# Patient Record
Sex: FEMALE | Race: OTHER | HISPANIC OR LATINO | Employment: UNEMPLOYED | URBAN - METROPOLITAN AREA
[De-identification: names, ages, dates, MRNs, and addresses within clinical notes are randomized per-mention and may not be internally consistent; named-entity substitution may affect disease eponyms.]

---

## 2023-12-28 ENCOUNTER — HOSPITAL ENCOUNTER (EMERGENCY)
Facility: MEDICAL CENTER | Age: 28
End: 2023-12-28
Attending: EMERGENCY MEDICINE
Payer: MEDICAID

## 2023-12-28 VITALS
RESPIRATION RATE: 16 BRPM | SYSTOLIC BLOOD PRESSURE: 103 MMHG | DIASTOLIC BLOOD PRESSURE: 59 MMHG | HEART RATE: 55 BPM | TEMPERATURE: 97.9 F | OXYGEN SATURATION: 96 % | WEIGHT: 143.3 LBS | HEIGHT: 65 IN | BODY MASS INDEX: 23.88 KG/M2

## 2023-12-28 DIAGNOSIS — R68.89 FLU-LIKE SYMPTOMS: Primary | ICD-10-CM

## 2023-12-28 DIAGNOSIS — J02.9 ACUTE VIRAL PHARYNGITIS: ICD-10-CM

## 2023-12-28 LAB
FLUAV RNA SPEC QL NAA+PROBE: NEGATIVE
FLUBV RNA SPEC QL NAA+PROBE: NEGATIVE
RSV RNA SPEC QL NAA+PROBE: NEGATIVE
S PYO DNA SPEC NAA+PROBE: NOT DETECTED
SARS-COV-2 RNA RESP QL NAA+PROBE: NOTDETECTED

## 2023-12-28 PROCEDURE — 99283 EMERGENCY DEPT VISIT LOW MDM: CPT

## 2023-12-28 PROCEDURE — 0241U HCHG SARS-COV-2 COVID-19 NFCT DS RESP RNA 4 TRGT ED POC: CPT

## 2023-12-28 PROCEDURE — 87651 STREP A DNA AMP PROBE: CPT

## 2023-12-28 PROCEDURE — C9803 HOPD COVID-19 SPEC COLLECT: HCPCS

## 2023-12-28 RX ORDER — ACETAMINOPHEN 500 MG
1000 TABLET ORAL EVERY 6 HOURS PRN
Qty: 20 TABLET | Refills: 0 | Status: SHIPPED | OUTPATIENT
Start: 2023-12-28 | End: 2024-01-01

## 2023-12-28 RX ORDER — IBUPROFEN 800 MG/1
800 TABLET ORAL EVERY 8 HOURS PRN
Qty: 9 TABLET | Refills: 0 | Status: SHIPPED | OUTPATIENT
Start: 2023-12-28 | End: 2023-12-31

## 2023-12-28 ASSESSMENT — PAIN DESCRIPTION - PAIN TYPE: TYPE: ACUTE PAIN

## 2023-12-29 NOTE — ED PROVIDER NOTES
ED Provider Note    Scribed for Alfonzo Marroquin by Jamila Peraza. 12/28/2023  6:55 PM    Primary care provider: No primary care provider noted.  Means of arrival: Private vehicle  History obtained from: Patient  History limited by: None    CHIEF COMPLAINT  Chief Complaint   Patient presents with    Flu Like Symptoms     Began this morning, headache, sore throat, runny nose, states feels like the beginning of the flu, 8/10 general pain, partner has the flu       HPI/ROS  LIMITATION TO HISTORY   Select: Language Puerto Rican,  Used     HPI  Monika Amaya is a 28 y.o. female who presents to the Emergency Department for evaluation of flu like symptoms onset this morning. She describes that her significant other who lives with her currently has a cold. The patient states she also has headache, sore throat and nasal congestion, but denies any cough. She notes that it hurts when she swallows. The patient reports that all her symptoms started this morning. Per nursing note, the patient has general pain that is an 8/10 in severity. The patient had some tea with honey, but denies the use of Tylenol or Motrin today. The patient denies any past medical history. She also denies the use of daily medication. The patient denies the use of drugs, alcohol, or tobacco.     REVIEW OF SYSTEMS  As above, all other systems reviewed and are negative.   See HPI for further details.     PAST MEDICAL HISTORY   The patient denies any past medical history.    SURGICAL HISTORY  patient denies any surgical history    SOCIAL HISTORY  Social History     Tobacco Use    Smoking status: Never    Smokeless tobacco: Never   Vaping Use    Vaping Use: Never used   Substance Use Topics    Alcohol use: Never    Drug use: Never      Social History     Substance and Sexual Activity   Drug Use Never     FAMILY HISTORY  History reviewed. No pertinent family history.    CURRENT MEDICATIONS  Home Medications       Reviewed by Kate  "ED Lisa R.N. (Registered Nurse) on 12/28/23 at 1828  Med List Status: Partial     Medication Last Dose Status        Patient Paulino Taking any Medications                         ALLERGIES  No Known Allergies    PHYSICAL EXAM    VITAL SIGNS:   Vitals:    12/28/23 1815 12/28/23 1818   BP: 103/55    Pulse: 98    Resp: 20    Temp: 36.8 °C (98.2 °F)    TempSrc: Temporal    SpO2: 98%    Weight:  65 kg (143 lb 4.8 oz)   Height:  1.651 m (5' 5\")     Vitals: My interpretation: normotensive, not tachycardic, afebrile, not hypoxic    Reinterpretation of vitals: Unchanged unremarkable    PE:   Gen: sitting comfortably, speaking clearly, appears in no acute distress   ENT: Mucous membranes moist, posterior pharynx clear, no exudates or plaques, uvula midline, nares patent bilaterally.  Neck: Supple, FROM  Pulmonary: Lungs are clear to auscultation bilaterally. No tachypnea  CV:  RRR, no murmur appreciated, pulses 2+ in both upper and lower extremities  Abdomen: soft, NT/ND; no rebound/guarding  : no CVA or suprapubic tenderness   Neuro: A&Ox4 (person, place, time, situation), speech fluent, gait steady, no focal deficits appreciated  Skin: No rash or lesions.  No pallor or jaundice.  No cyanosis.  Warm and dry.     DIAGNOSTIC STUDIES / PROCEDURES    LABS  Results for orders placed or performed during the hospital encounter of 12/28/23   Group A Strep by PCR    Specimen: Throat   Result Value Ref Range    Group A Strep by PCR Not Detected Not Detected   POC CoV-2, FLU A/B, RSV by PCR   Result Value Ref Range    POC Influenza A RNA, PCR Negative Negative    POC Influenza B RNA, PCR Negative Negative    POC RSV, by PCR Negative Negative    POC SARS-CoV-2, PCR NotDetected       All labs reviewed by me. Labs were compared to prior labs if they were available. Significant for negative flu, negative COVID, negative RSV, negative strep    COURSE & MEDICAL DECISION MAKING  Nursing notes, VS, PMSFHx, labs, imaging, EKG reviewed in " chart.    ED Observation Status? No; Patient does not meet criteria for ED Observation.     Ddx: Flu, COVID, RSV, strep, meningitis, pneumonia    MDM: 6:55 PM Monika Amaya is a 28 y.o. female who presented with generalized flulike symptoms since waking up this morning.  Her partner at home was diagnosed with influenza she states has a cold for the past few days.  Her symptoms started this morning with headache, runny nose, congestion, sore throat.  In general flulike symptoms.  She is healthy, takes medications.  Denies fevers.  No neck symptoms or hurts or rash.  No nausea or vomiting or cough.  Does not feel short of breath or have chest pain.  Her vital signs are unremarkable she is afebrile.  Her physical exam is very reassuring and benign, clear lungs, no signs of infection, denies pregnancy status, denies dysuria hematuria.  She underwent flu, COVID, RSV and strep testing which were all negative.  I suspect she likely has early influenza and tested negative because her symptoms simply started this morning versus some other type of URI/viral illness.  Regardless the treatment will be the same with Tylenol Motrin and conservative management as an outpatient.  Instructed on return precautions and the patient verbalized understanding.   was used for all interactions.    ADDITIONAL PROBLEM LIST AND DISPOSITION    I have discussed management of the patient with the following physicians and RE's:  None.    Discussion of management with other [facility] or appropriate source(s): None     Escalation of care considered, and ultimately not performed:diagnostic imaging    Barriers to care at this time, including but not limited to: Patient does not have established PCP.     Decision tools and prescription drugs considered including, but not limited to: Pain Medications Tylenol Motrin .    FINAL IMPRESSION  1. Flu-like symptoms Acute      I, Jamila Peraza (Leighann), am scribing for, and in the  presence of, Alfonzo Marroquin.    Electronically signed by: Jamila Peraza (Scribe), 12/28/2023    I, Alfonzo Marroquin personally performed the services described in this documentation, as scribed by Jamila Peraza in my presence, and it is both accurate and complete.    The note accurately reflects work and decisions made by me.  Alfonzo Marroquin  12/28/2023  8:41 PM

## 2023-12-29 NOTE — ED TRIAGE NOTES
"Chief Complaint   Patient presents with    Flu Like Symptoms     Began this morning, headache, sore throat, runny nose, states feels like the beginning of the flu, 8/10 general pain, partner has the flu        Ambulated to triage for above complaint.     COVID protocols ordered. Pt educated of triage process and informed to contact staff if situation changes.    /55   Pulse 98   Temp 36.8 °C (98.2 °F) (Temporal)   Resp 20   Ht 1.651 m (5' 5\")   Wt 65 kg (143 lb 4.8 oz)   SpO2 98%   BMI 23.85 kg/m²      "

## 2023-12-29 NOTE — DISCHARGE INSTRUCTIONS
I think it is likely that they have a viral syndrome, quite possibly COVID, flu or RSV.    There is no particular treatment at this time for viral syndromes other than alternating doses of children's Tylenol and Motrin, up to 3 doses per day of each, and staying well-hydrated.  The patient can follow-up with your PCP for further evaluation.  They will likely have symptoms for a few more days and may develop a cough that can linger up to several weeks.  They must be without fever for 24 hours before they can return to school.  If anytime there is concerns for worsening symptoms, I want you return to the ED for further evaluation and treatment.  Thank you for coming in today.

## 2024-02-03 ENCOUNTER — APPOINTMENT (OUTPATIENT)
Dept: RADIOLOGY | Facility: MEDICAL CENTER | Age: 29
End: 2024-02-03
Attending: EMERGENCY MEDICINE
Payer: MEDICAID

## 2024-02-03 ENCOUNTER — HOSPITAL ENCOUNTER (EMERGENCY)
Facility: MEDICAL CENTER | Age: 29
End: 2024-02-03
Attending: EMERGENCY MEDICINE
Payer: MEDICAID

## 2024-02-03 VITALS
TEMPERATURE: 98.8 F | OXYGEN SATURATION: 99 % | SYSTOLIC BLOOD PRESSURE: 111 MMHG | HEIGHT: 65 IN | HEART RATE: 73 BPM | BODY MASS INDEX: 24.54 KG/M2 | WEIGHT: 147.27 LBS | DIASTOLIC BLOOD PRESSURE: 88 MMHG | RESPIRATION RATE: 15 BRPM

## 2024-02-03 DIAGNOSIS — S60.222A CONTUSION OF LEFT HAND, INITIAL ENCOUNTER: ICD-10-CM

## 2024-02-03 DIAGNOSIS — T74.91XA DOMESTIC VIOLENCE OF ADULT, INITIAL ENCOUNTER: ICD-10-CM

## 2024-02-03 PROCEDURE — 700102 HCHG RX REV CODE 250 W/ 637 OVERRIDE(OP): Mod: UD | Performed by: EMERGENCY MEDICINE

## 2024-02-03 PROCEDURE — A9270 NON-COVERED ITEM OR SERVICE: HCPCS | Mod: UD | Performed by: EMERGENCY MEDICINE

## 2024-02-03 PROCEDURE — 73080 X-RAY EXAM OF ELBOW: CPT | Mod: LT

## 2024-02-03 PROCEDURE — 99284 EMERGENCY DEPT VISIT MOD MDM: CPT

## 2024-02-03 PROCEDURE — 73130 X-RAY EXAM OF HAND: CPT | Mod: LT

## 2024-02-03 RX ORDER — IBUPROFEN 600 MG/1
600 TABLET ORAL ONCE
Status: COMPLETED | OUTPATIENT
Start: 2024-02-03 | End: 2024-02-03

## 2024-02-03 RX ADMIN — IBUPROFEN 600 MG: 600 TABLET, FILM COATED ORAL at 17:31
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
## 2024-02-04 NOTE — ED NOTES
Pt is discharged. VSS. Paperwork explained to pt by ERP and all questions answered. All belongings sent with pt upon departure. Pt ambulatory with a steady gait out of ED.     ID: [id number] Hi Burciaga
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
## 2024-05-03 ENCOUNTER — PHARMACY VISIT (OUTPATIENT)
Dept: PHARMACY | Facility: MEDICAL CENTER | Age: 29
End: 2024-05-03
Payer: COMMERCIAL

## 2024-05-03 ENCOUNTER — OFFICE VISIT (OUTPATIENT)
Dept: URGENT CARE | Facility: CLINIC | Age: 29
End: 2024-05-03
Payer: MEDICAID

## 2024-05-03 VITALS
HEART RATE: 68 BPM | BODY MASS INDEX: 26.45 KG/M2 | SYSTOLIC BLOOD PRESSURE: 96 MMHG | WEIGHT: 140.1 LBS | OXYGEN SATURATION: 97 % | DIASTOLIC BLOOD PRESSURE: 54 MMHG | HEIGHT: 61 IN | TEMPERATURE: 97.7 F | RESPIRATION RATE: 20 BRPM

## 2024-05-03 DIAGNOSIS — K04.7 DENTAL INFECTION: ICD-10-CM

## 2024-05-03 DIAGNOSIS — K08.89 PAIN, DENTAL: ICD-10-CM

## 2024-05-03 PROCEDURE — 3074F SYST BP LT 130 MM HG: CPT | Performed by: PHYSICIAN ASSISTANT

## 2024-05-03 PROCEDURE — 3078F DIAST BP <80 MM HG: CPT | Performed by: PHYSICIAN ASSISTANT

## 2024-05-03 PROCEDURE — 99203 OFFICE O/P NEW LOW 30 MIN: CPT | Performed by: PHYSICIAN ASSISTANT

## 2024-05-03 PROCEDURE — RXMED WILLOW AMBULATORY MEDICATION CHARGE: Performed by: PHYSICIAN ASSISTANT

## 2024-05-03 RX ORDER — IBUPROFEN 600 MG/1
600 TABLET ORAL EVERY 8 HOURS PRN
Qty: 30 TABLET | Refills: 0 | Status: SHIPPED | OUTPATIENT
Start: 2024-05-03

## 2024-05-03 RX ORDER — AMOXICILLIN 500 MG/1
500 CAPSULE ORAL 3 TIMES DAILY
Qty: 21 CAPSULE | Refills: 0 | Status: SHIPPED | OUTPATIENT
Start: 2024-05-03 | End: 2024-05-10

## 2024-05-03 ASSESSMENT — ENCOUNTER SYMPTOMS
FEVER: 0
SINUS PRESSURE: 0

## 2024-05-04 NOTE — PROGRESS NOTES
"  Subjective:   Monika Amaya is a 28 y.o. female who presents today with   Chief Complaint   Patient presents with    Dental Pain     X1day right upper/bottom toothache/     Dental Pain   This is a new problem. The current episode started yesterday. The problem occurs constantly. The problem has been unchanged. The pain is moderate. Associated symptoms include facial pain. Pertinent negatives include no difficulty swallowing, fever, oral bleeding, sinus pressure or thermal sensitivity. She has tried nothing for the symptoms. The treatment provided no relief.     Nauruan-speaking  present today.    PMH:  has no past medical history on file.  MEDS:   Current Outpatient Medications:     amoxicillin (AMOXIL) 500 MG Cap, Take 1 Capsule by mouth 3 times a day for 7 days., Disp: 21 Capsule, Rfl: 0    ibuprofen (MOTRIN) 600 MG Tab, Take 1 Tablet by mouth every 8 hours as needed for Moderate Pain., Disp: 30 Tablet, Rfl: 0  ALLERGIES: No Known Allergies  SURGHX: No past surgical history on file.  SOCHX:  reports that she has never smoked. She has never used smokeless tobacco. She reports that she does not drink alcohol and does not use drugs.  FH: Reviewed with patient, not pertinent to this visit.     Review of Systems   Constitutional:  Negative for fever.   HENT:  Negative for sinus pressure.         Dental pain      Objective:   BP 96/54   Pulse 68   Temp 36.5 °C (97.7 °F) (Temporal)   Resp 20   Ht 1.55 m (5' 1.02\")   Wt 63.5 kg (140 lb 1.6 oz)   SpO2 97%   BMI 26.45 kg/m²   Physical Exam  Vitals and nursing note reviewed.   Constitutional:       General: She is not in acute distress.     Appearance: Normal appearance. She is well-developed. She is not ill-appearing or toxic-appearing.   HENT:      Head: Normocephalic and atraumatic.        Comments: Tenderness to palpation to the right side of the face.  No submandibular swelling.     Right Ear: Hearing normal.      Left Ear: Hearing " normal.      Mouth/Throat:      Mouth: Mucous membranes are moist.      Dentition: Abnormal dentition. Dental tenderness, gingival swelling and dental abscesses present. No dental caries.      Pharynx: Uvula midline. No posterior oropharyngeal erythema.      Tonsils: No tonsillar exudate or tonsillar abscesses.     Cardiovascular:      Rate and Rhythm: Normal rate.   Pulmonary:      Effort: Pulmonary effort is normal.   Musculoskeletal:      Comments: Normal movement in all 4 extremities   Skin:     General: Skin is warm and dry.   Neurological:      Mental Status: She is alert.      Coordination: Coordination normal.   Psychiatric:         Mood and Affect: Mood normal.         Assessment/Plan:   Assessment    1. Pain, dental  - ibuprofen (MOTRIN) 600 MG Tab; Take 1 Tablet by mouth every 8 hours as needed for Moderate Pain.  Dispense: 30 Tablet; Refill: 0    2. Dental infection  - amoxicillin (AMOXIL) 500 MG Cap; Take 1 Capsule by mouth 3 times a day for 7 days.  Dispense: 21 Capsule; Refill: 0    Symptoms and presentation are consistent with dental infection would suggest following up with dentist.  Will send an antibiotic as well as ibuprofen prescription strength for her to take as prescribed if needed.    Differential diagnosis, natural history, supportive care, and indications for immediate follow-up discussed.   Patient given instructions and understanding of medications and treatment.    If not improving in 3-5 days, F/U with PCP or return to  if symptoms worsen.  Strict ER precautions given.  Patient agreeable to plan.      Please note that this dictation was created using voice recognition software. I have made every reasonable attempt to correct obvious errors, but I expect that there are errors of grammar and possibly content that I did not discover before finalizing the note.    Francesco Serrano PA-C

## 2024-07-13 ENCOUNTER — APPOINTMENT (OUTPATIENT)
Dept: URGENT CARE | Facility: CLINIC | Age: 29
End: 2024-07-13
Payer: MEDICAID